# Patient Record
(demographics unavailable — no encounter records)

---

## 2024-11-13 NOTE — HISTORY OF PRESENT ILLNESS
[Other Location: e.g. School (Enter Location, City,State)___] : at [unfilled], at the time of the visit. [Medical Office: ([facility])___] : at the medical office located in  [Verbal consent obtained from patient] : the patient, [unfilled] [FreeTextEntry1] : cough and congestion [de-identified] : Pt is on her way back to [place] from [place], on the [employer] train, she is unable to make her appointment in person today and requested a video telehealth with me. She has been having a headache, sinus pressure, coughing up yellowish green phlegm, nasal congestion x2 days, took covid home test which was negative. She denies fever, chills, chest pain, sob, abdominal pain, nausea, vomiting, diarrhea, constipation, sore throat.  Feels fine otherwise with no other concerns or complaints today.